# Patient Record
Sex: FEMALE | Race: WHITE | NOT HISPANIC OR LATINO | URBAN - METROPOLITAN AREA
[De-identification: names, ages, dates, MRNs, and addresses within clinical notes are randomized per-mention and may not be internally consistent; named-entity substitution may affect disease eponyms.]

---

## 2019-11-10 ENCOUNTER — EMERGENCY (EMERGENCY)
Facility: HOSPITAL | Age: 34
LOS: 1 days | Discharge: HOME | End: 2019-11-10
Attending: EMERGENCY MEDICINE | Admitting: EMERGENCY MEDICINE
Payer: SELF-PAY

## 2019-11-10 VITALS
TEMPERATURE: 98 F | SYSTOLIC BLOOD PRESSURE: 111 MMHG | HEART RATE: 99 BPM | RESPIRATION RATE: 16 BRPM | DIASTOLIC BLOOD PRESSURE: 69 MMHG | OXYGEN SATURATION: 96 % | HEIGHT: 64 IN | WEIGHT: 111.99 LBS

## 2019-11-10 PROCEDURE — 99282 EMERGENCY DEPT VISIT SF MDM: CPT

## 2019-11-10 NOTE — ED PROVIDER NOTE - PHYSICAL EXAMINATION
*Gen: NAD, AAO*3  *CV: RRR, S1/S2 present  *Resp: no respiratory distress, LCTAB  *Neuro: no focal neuro deficits, moving all limbs appropriately  *Extremities: no gross deformity  *Skin: no rashes, repaired 1.5cm laceration to distal PIP of left thumb, no redness/drainage from site, full sensation throughout, + capillary refill < 2 sec, full 5/5 strength in flexion and extension of her finger, cannot extent finger past 180*  ~ Hebert Zuniga M.D.

## 2019-11-10 NOTE — ED PROVIDER NOTE - ATTENDING CONTRIBUTION TO CARE
I have personally seen and examined this patient. I have fully participated in the care of this patient. I have reviewed all pertinent clinical information, including history, physical exam and plan.    wound appears well repaired no signs of infection  pt provided hand referral and instructed to f/u tomorrow monday fro further evaluation of thumb function/tendon function. pt verbally expresses understanding of need to prompt follow up

## 2019-11-10 NOTE — ED PROVIDER NOTE - CLINICAL SUMMARY MEDICAL DECISION MAKING FREE TEXT BOX
34 year-old female p/w laceration to dorsal aspect of left thumb; repaired with three stitches at .  Finger is neurovascularly intact; plan to give plastic/hand surgery follow-up.  No acute intervention.  Limitation of extension past 180* likely secondary to inflammation.

## 2019-11-10 NOTE — ED PROVIDER NOTE - PATIENT PORTAL LINK FT
You can access the FollowMyHealth Patient Portal offered by [facility] by registering at the following website: [email or web address]. By joining [employer]’s FollowMyHealth portal, you will also be able to view your health information using other applications (apps) compatible with our system.

## 2019-11-10 NOTE — ED ADULT NURSE NOTE - NSIMPLEMENTINTERV_GEN_ALL_ED
Implemented All Universal Safety Interventions:  [place] to call system. Call bell, personal items and telephone within reach. Instruct patient to call for assistance. Room bathroom lighting operational. Non-slip footwear when patient is off stretcher. Physically safe environment: no spills, clutter or unnecessary equipment. Stretcher in lowest position, wheels locked, appropriate side rails in place.

## 2019-11-10 NOTE — ED ADULT TRIAGE NOTE - CHIEF COMPLAINT QUOTE
Patient s/p laceration repair to left thumb at urgent care, present to ED for decreased ROM to thumb

## 2019-11-10 NOTE — ED PROVIDER NOTE - NSFOLLOWUPINSTRUCTIONS_ED_ALL_ED_FT
Please follow-up with Dr. Hayley Bland; her office is located at [address] [place] [address] 14[facility], [place] [address].  Her office phone number is [phone], please call tomorrow 11/11/19 for follow-up within the week.    Return to the Emergency Department immediately if you experience worsening pain, fever, drainage from site, redness, red streaking, and/or new, worsening or concerning symptoms.    Can use Tylenol or Ibuprofen as per package directions for pain - use only as needed.  These are over-the-counter medications - please respect the warnings on the label.

## 2019-11-10 NOTE — ED PROVIDER NOTE - OBJECTIVE STATEMENT
34 year-old female p/w laceration to dorsal aspect of left thumb.  Patient was cutting some frozen fruit when the knife slipped and sliced her finger at distal PIP.  Went to ; laceration repaired with three stitches.  Patient is able to flex and extend her thumb; but reports she feels limited in the degree of extension.  No other trauma or injuries.  Received tetanus vaccination at .
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
## 2019-11-15 DIAGNOSIS — S61.012A LACERATION WITHOUT FOREIGN BODY OF LEFT THUMB WITHOUT DAMAGE TO NAIL, INITIAL ENCOUNTER: ICD-10-CM
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
